# Patient Record
Sex: FEMALE | Race: BLACK OR AFRICAN AMERICAN | NOT HISPANIC OR LATINO | Employment: FULL TIME | URBAN - METROPOLITAN AREA
[De-identification: names, ages, dates, MRNs, and addresses within clinical notes are randomized per-mention and may not be internally consistent; named-entity substitution may affect disease eponyms.]

---

## 2017-01-10 ENCOUNTER — ALLSCRIPTS OFFICE VISIT (OUTPATIENT)
Dept: OTHER | Facility: OTHER | Age: 22
End: 2017-01-10

## 2017-01-13 LAB
CHLAMYDIA, LIQUID-BASED (HISTORICAL): DETECTED
GC BY MOL. METHOD (HISTORICAL): NOT DETECTED
PAP (HISTORICAL): ABNORMAL

## 2017-01-17 ENCOUNTER — GENERIC CONVERSION - ENCOUNTER (OUTPATIENT)
Dept: OTHER | Facility: OTHER | Age: 22
End: 2017-01-17

## 2017-02-10 ENCOUNTER — APPOINTMENT (OUTPATIENT)
Dept: LAB | Facility: CLINIC | Age: 22
End: 2017-02-10
Payer: COMMERCIAL

## 2017-02-10 ENCOUNTER — GENERIC CONVERSION - ENCOUNTER (OUTPATIENT)
Dept: OTHER | Facility: OTHER | Age: 22
End: 2017-02-10

## 2017-02-10 DIAGNOSIS — Z34.00 ENCOUNTER FOR SUPERVISION OF NORMAL FIRST PREGNANCY: ICD-10-CM

## 2017-02-10 PROCEDURE — 86336 INHIBIN A: CPT

## 2017-02-10 PROCEDURE — 82105 ALPHA-FETOPROTEIN SERUM: CPT

## 2017-02-10 PROCEDURE — 36415 COLL VENOUS BLD VENIPUNCTURE: CPT

## 2017-02-10 PROCEDURE — 82677 ASSAY OF ESTRIOL: CPT

## 2017-02-10 PROCEDURE — 84702 CHORIONIC GONADOTROPIN TEST: CPT

## 2017-02-14 LAB
2ND TRIMESTER 4 SCREEN SERPL-IMP: NORMAL
2ND TRIMESTER 4 SCREEN SERPL-IMP: NORMAL
AFP ADJ MOM SERPL: 1.59
AFP SERPL-MCNC: 48.1 NG/ML
AGE AT DELIVERY: 23.2 YEARS
FET TS 18 RISK FROM MAT AGE: NORMAL
FET TS 21 RISK FROM MAT AGE: 1097
GA METHOD: NORMAL
GA: 17 WEEKS
HCG ADJ MOM SERPL: 0.71
HCG SERPL-ACNC: NORMAL MIU/ML
IDDM PATIENT QL: NO
INHIBIN A ADJ MOM SERPL: 0.86
INHIBIN A SERPL-MCNC: 123.5 PG/ML
KARYOTYP BLD/T: NORMAL
Lab: NORMAL
MULTIPLE PREGNANCY: NORMAL
NEURAL TUBE DEFECT RISK FETUS: 2155 %
SERVICE CMNT-IMP: NORMAL
TS 18 RISK FETUS: NORMAL
TS 21 RISK FETUS: NORMAL
U ESTRIOL ADJ MOM SERPL: 0.88
U ESTRIOL SERPL-MCNC: 0.89 NG/ML

## 2017-03-04 ENCOUNTER — LAB CONVERSION - ENCOUNTER (OUTPATIENT)
Dept: OTHER | Facility: OTHER | Age: 22
End: 2017-03-04

## 2017-03-04 LAB — CHLAMYDIA, LIQUID-BASED (HISTORICAL): NOT DETECTED

## 2017-03-06 ENCOUNTER — GENERIC CONVERSION - ENCOUNTER (OUTPATIENT)
Dept: OTHER | Facility: OTHER | Age: 22
End: 2017-03-06

## 2017-03-08 ENCOUNTER — ALLSCRIPTS OFFICE VISIT (OUTPATIENT)
Dept: PERINATAL CARE | Facility: CLINIC | Age: 22
End: 2017-03-08
Payer: COMMERCIAL

## 2017-03-08 ENCOUNTER — GENERIC CONVERSION - ENCOUNTER (OUTPATIENT)
Dept: OTHER | Facility: OTHER | Age: 22
End: 2017-03-08

## 2017-03-08 PROCEDURE — 76817 TRANSVAGINAL US OBSTETRIC: CPT | Performed by: OBSTETRICS & GYNECOLOGY

## 2017-03-08 PROCEDURE — 76805 OB US >/= 14 WKS SNGL FETUS: CPT | Performed by: OBSTETRICS & GYNECOLOGY

## 2017-04-07 DIAGNOSIS — Z34.00 ENCOUNTER FOR SUPERVISION OF NORMAL FIRST PREGNANCY: ICD-10-CM

## 2017-04-20 ENCOUNTER — APPOINTMENT (OUTPATIENT)
Dept: LAB | Facility: CLINIC | Age: 22
End: 2017-04-20
Payer: COMMERCIAL

## 2017-04-20 DIAGNOSIS — Z34.00 ENCOUNTER FOR SUPERVISION OF NORMAL FIRST PREGNANCY: ICD-10-CM

## 2017-04-20 LAB
BASOPHILS # BLD AUTO: 0.03 THOUSANDS/ΜL (ref 0–0.1)
BASOPHILS NFR BLD AUTO: 0 % (ref 0–1)
EOSINOPHIL # BLD AUTO: 0.18 THOUSAND/ΜL (ref 0–0.61)
EOSINOPHIL NFR BLD AUTO: 2 % (ref 0–6)
ERYTHROCYTE [DISTWIDTH] IN BLOOD BY AUTOMATED COUNT: 13.7 % (ref 11.6–15.1)
GLUCOSE 1H P 50 G GLC PO SERPL-MCNC: 96 MG/DL
HCT VFR BLD AUTO: 35.2 % (ref 34.8–46.1)
HGB BLD-MCNC: 11.3 G/DL (ref 11.5–15.4)
LYMPHOCYTES # BLD AUTO: 1.65 THOUSANDS/ΜL (ref 0.6–4.47)
LYMPHOCYTES NFR BLD AUTO: 14 % (ref 14–44)
MCH RBC QN AUTO: 27.6 PG (ref 26.8–34.3)
MCHC RBC AUTO-ENTMCNC: 32.1 G/DL (ref 31.4–37.4)
MCV RBC AUTO: 86 FL (ref 82–98)
MONOCYTES # BLD AUTO: 0.87 THOUSAND/ΜL (ref 0.17–1.22)
MONOCYTES NFR BLD AUTO: 7 % (ref 4–12)
NEUTROPHILS # BLD AUTO: 9.23 THOUSANDS/ΜL (ref 1.85–7.62)
NEUTS SEG NFR BLD AUTO: 77 % (ref 43–75)
NRBC BLD AUTO-RTO: 0 /100 WBCS
PLATELET # BLD AUTO: 261 THOUSANDS/UL (ref 149–390)
PMV BLD AUTO: 10.8 FL (ref 8.9–12.7)
RBC # BLD AUTO: 4.09 MILLION/UL (ref 3.81–5.12)
WBC # BLD AUTO: 12.04 THOUSAND/UL (ref 4.31–10.16)

## 2017-04-20 PROCEDURE — 82950 GLUCOSE TEST: CPT

## 2017-04-20 PROCEDURE — 85025 COMPLETE CBC W/AUTO DIFF WBC: CPT

## 2017-04-20 PROCEDURE — 86592 SYPHILIS TEST NON-TREP QUAL: CPT

## 2017-04-20 PROCEDURE — 36415 COLL VENOUS BLD VENIPUNCTURE: CPT

## 2017-04-21 LAB — RPR SER QL: NORMAL

## 2017-05-03 ENCOUNTER — GENERIC CONVERSION - ENCOUNTER (OUTPATIENT)
Dept: OTHER | Facility: OTHER | Age: 22
End: 2017-05-03

## 2017-05-17 ENCOUNTER — GENERIC CONVERSION - ENCOUNTER (OUTPATIENT)
Dept: OTHER | Facility: OTHER | Age: 22
End: 2017-05-17

## 2017-06-13 ENCOUNTER — GENERIC CONVERSION - ENCOUNTER (OUTPATIENT)
Dept: OTHER | Facility: OTHER | Age: 22
End: 2017-06-13

## 2017-06-28 ENCOUNTER — GENERIC CONVERSION - ENCOUNTER (OUTPATIENT)
Dept: OTHER | Facility: OTHER | Age: 22
End: 2017-06-28

## 2017-06-28 ENCOUNTER — LAB REQUISITION (OUTPATIENT)
Dept: LAB | Facility: HOSPITAL | Age: 22
End: 2017-06-28
Payer: COMMERCIAL

## 2017-06-28 DIAGNOSIS — Z34.03 ENCOUNTER FOR SUPERVISION OF NORMAL FIRST PREGNANCY IN THIRD TRIMESTER: ICD-10-CM

## 2017-06-28 PROCEDURE — 87653 STREP B DNA AMP PROBE: CPT | Performed by: OBSTETRICS & GYNECOLOGY

## 2017-07-02 LAB — GP B STREP DNA SPEC QL NAA+PROBE: NORMAL

## 2017-07-05 ENCOUNTER — ALLSCRIPTS OFFICE VISIT (OUTPATIENT)
Dept: OTHER | Facility: OTHER | Age: 22
End: 2017-07-05

## 2017-07-05 ENCOUNTER — GENERIC CONVERSION - ENCOUNTER (OUTPATIENT)
Dept: OTHER | Facility: OTHER | Age: 22
End: 2017-07-05

## 2017-07-05 PROCEDURE — 87591 N.GONORRHOEAE DNA AMP PROB: CPT | Performed by: NURSE PRACTITIONER

## 2017-07-05 PROCEDURE — 87491 CHLMYD TRACH DNA AMP PROBE: CPT | Performed by: NURSE PRACTITIONER

## 2017-07-08 ENCOUNTER — LAB REQUISITION (OUTPATIENT)
Dept: LAB | Facility: HOSPITAL | Age: 22
End: 2017-07-08
Payer: COMMERCIAL

## 2017-07-08 DIAGNOSIS — Z20.2 CONTACT WITH AND (SUSPECTED) EXPOSURE TO INFECTIONS WITH A PREDOMINANTLY SEXUAL MODE OF TRANSMISSION: ICD-10-CM

## 2017-07-12 ENCOUNTER — GENERIC CONVERSION - ENCOUNTER (OUTPATIENT)
Dept: OTHER | Facility: OTHER | Age: 22
End: 2017-07-12

## 2017-07-14 LAB
CHLAMYDIA DNA CVX QL NAA+PROBE: NORMAL
N GONORRHOEA DNA GENITAL QL NAA+PROBE: NORMAL

## 2017-07-15 ENCOUNTER — GENERIC CONVERSION - ENCOUNTER (OUTPATIENT)
Dept: OTHER | Facility: OTHER | Age: 22
End: 2017-07-15

## 2017-07-19 ENCOUNTER — GENERIC CONVERSION - ENCOUNTER (OUTPATIENT)
Dept: OTHER | Facility: OTHER | Age: 22
End: 2017-07-19

## 2017-08-03 ENCOUNTER — GENERIC CONVERSION - ENCOUNTER (OUTPATIENT)
Dept: OTHER | Facility: OTHER | Age: 22
End: 2017-08-03

## 2017-08-04 ENCOUNTER — GENERIC CONVERSION - ENCOUNTER (OUTPATIENT)
Dept: OTHER | Facility: OTHER | Age: 22
End: 2017-08-04

## 2018-01-09 NOTE — RESULT NOTES
Verified Results  CHLAMYDIA, LIQUID-BASED [id number] 04:03PM Lilia Sue     Test Name Result Flag Reference   CHLAMYDIA, LIQUID-BASED Not Detected     SPECIMEN SOURCE:      CHLAMYDIA, LIQUID-BASED  CLINICAL INFORMATION: Provided Diagnosis Codes: Z20 2  CHLAMYDIA, LIQUID-BASED (1,2,3)  (1)  Chlamydia trachomatis (CT) and Neisseria gonorrhoeae (NG) qualitative   detection is performed on urogenital specimens or urine on one of the   below platforms:  -[employer](TM) Qx Amplified DNA Assay tested with the BD Viper(TM)   System using Strand Displacement Amplification technology  FDA cleared   for both ThinPrep, SurePath and urine samples  -The Aptima combo 2(R) Assay detects ribosomal RNA (rRNA) using target   capture and Transcription-Mediated Amplification (TMA) technology  FDA   cleared for ThinPrep samples  -The lisa(R) CT/NG v2 0 detects DNA using Polymerase Chain Reaction   (PCR) technology  FDA cleared for ThinPrep, male and female urine   samples  (2)  This test was evaluated and its performance characteristics determined   by [facility]  It has not been cleared or approved by   the U S  Food and Drug Administration  The FDA has determined that   such clearance or approval is not necessary  [facility]   is certified under the Clinical Laboratory Improvement Act of 1988   (CLIA) as qualified to perform high complexity clinical testing   (3)  Results should be interpreted together with past and current clinical   and laboratory data

## 2018-01-11 NOTE — RESULT NOTES
Verified Results  (1) CHLAMYDIA/GC AMPLIFIED DNA, PCR [id number] 03:00PM nAat Syed     Test Name Result Flag Reference   CHLAMYDIA,AMPLIFIED DNA PROBE   C  trachomatis Amplified DNA Negative   C  trachomatis Amplified DNA Negative   N  GONORRHOEAE AMPLIFIED DNA   N  gonorrhoeae Amplified DNA Negative   N  gonorrhoeae Amplified DNA Negative

## 2018-01-11 NOTE — PROGRESS NOTES
MAR 8 2017         RE: Greta Cockayne                                   To: [facility]  MR#: [id number]                                  [address]   : [address]   ENC: [id number]                             [address]   (Exam #: [id number])                           Fax: [phone]      The LMP of this 24year old,  G1, P*-0-0-0 patient was OCT 1 2016, her   working ALISA is 2017 and the current gestational age is 22 weeks 2   days by  [facility] [place] [address]  A sonographic examination was performed on MAR   8 2017 using real time equipment  The ultrasound examination was performed   using abdominal & vaginal techniques  The patient has a BMI of 30 3  Her   blood pressure today was 111/71  Earliest ultrasound found in her record: 01/10/17 13w1d 17 ALISA      Estefany Dumas is on prenatal vitamins and denies any allergies to medications  Prior to pregnancy she smoked cigarettes and drank alcohol socially  She   stopped both of these in pregnancy  She denies any use of illicit drugs  Her past medical history is significant for a chlamydia infection in   pregnancy  She was treated and test of cure was negative  She denies any   other past medical history or past surgical history or any first   generation family history of hypertension, diabetes, thrombosis or   congenital anomalies  She had a normal quad screen in this pregnancy  She   declined the flu shot        Cardiac motion was observed at 153 bpm       INDICATIONS      fetal anatomical survey      Exam Types      LEVEL II   Transvaginal      RESULTS      Fetus # 1 of 1   Vertex presentation   Fetal growth appeared normal   Placenta Location = Posterior   No placenta previa   Placenta Grade = I      MEASUREMENTS (* Included In Average GA)      AC              16 0 cm        20 weeks 6 days* (41%)   BPD              5 1 cm        21 weeks 3 days* (50%)   HC 19 0 cm        21 weeks 1 day * (45%)   Femur            3 6 cm        21 weeks 4 days* (49%)      Nuchal Fold      3 2 mm      Humerus          3 5 cm        22 weeks 1 day   (64%)      Cerebellum       2 4 cm        22 weeks 4 days   Biorbit          3 6 cm        22 weeks 6 days   CisternaMagna    7 1 mm      HC/AC           1 19   FL/AC           0 22   FL/BPD          0 70   EFW (Ac/Fl/Hc)   407 grams - 0 lbs 14 oz      THE AVERAGE GESTATIONAL AGE is 21 weeks 2 days +/- 10 days  AMNIOTIC FLUID         Largest Vertical Pocket = 3 0 cm   Amniotic Fluid: Normal      CERVICAL EVALUATION      SUPINE      Cervical Length: 4 60 cm      OTHER TEST RESULTS           Funneling?: No             Dynamic Changes?: No        Resp  To TFP?: No      ANATOMY      Head                                    Normal   Face/Neck                               Normal   Th  Cav  Normal   Heart                                   Normal   Abd  Cav  Normal   Stomach                                 Normal   Right Kidney                            Normal   Left Kidney                             Normal   Bladder                                 Normal   Abd  Wall                               Normal   Spine                                   Normal   Extrems                                 Normal   Genitalia                               Normal   Placenta                                Normal   Umbl  Cord                              Normal   Uterus                                  Normal   PCI                                     Normal      ANATOMY DETAILS      Visualized Appearing Sonographically Normal:   HEAD: (Calvarium, BPD Level, Cavum, Lateral Ventricles, Choroid Plexus,   Cerebellum, Cisterna Magna);    FACE/NECK: (Neck, Nuchal Fold, Profile,   Orbits, Nose/Lips, Palate, Face);    TH  CAV  : (Lungs, Diaphragm);       HEART: (Four Chamber View, Proximal Left Outflow, Proximal Right Outflow,   3VV, 3 Vessel Trachea, Short Axis of Greater Vessels, Ductal Arch, Aortic   Arch, Interventricular Septum, Interatrial Septum, IVC, SVC, Cardiac Axis,   Cardiac Position);    ABD  CAV : (Liver);    STOMACH, RIGHT KIDNEY, LEFT   KIDNEY, BLADDER, ABD  WALL, SPINE: (Cervical Spine, Thoracic Spine, Lumbar   Spine, Sacrum);    EXTREMS: (Lt Humerus, Rt Humerus, Lt Forearm, Rt   Forearm, Lt Hand, Rt Hand, Lt Femur, Rt Femur, Lt Low Leg, Rt Low Leg, Lt   Foot, Rt Foot);    GENITALIA (Female), PLACENTA, UMBL  CORD, UTERUS, PCI      ANATOMY COMMENTS       Her survey of the fetal anatomy is complete  No fetal structural abnormality or ultrasound marker for aneuploidy is   identified on the Level II ultrasound study today  Fetal growth and   amniotic fluid volume appear normal   Active movement of the fetal body &   extremities was seen  There is no suspicion of a subchorionic bleed  The   placental cord insertion was normal       ADNEXA      The left ovary appeared normal and measured 4 3 x 2 3 x 1 5 cm with a   volume of 7 8 cc  The right ovary was not visualized  IMPRESSION      Cruz IUP   21 weeks and 2 days by this ultrasound  (ALISA=JUL 17 2017)   21 weeks and 2 days by 1st Tri Sono  (ALISA=JUL 17 2017)   Vertex presentation   Fetal growth appeared normal   Normal anatomy survey   Regular fetal heart rate of 153 bpm   Posterior placenta   No placenta previa      RECOMMENDATION      Growth Ultrasound: As indicated      VAMSI Valentino M D     Maternal-Fetal Medicine   Electronically signed 03/09/17 07:17

## 2018-01-12 VITALS
DIASTOLIC BLOOD PRESSURE: 71 MMHG | SYSTOLIC BLOOD PRESSURE: 111 MMHG | WEIGHT: 205.4 LBS | BODY MASS INDEX: 30.42 KG/M2 | HEIGHT: 69 IN

## 2018-01-13 VITALS
DIASTOLIC BLOOD PRESSURE: 70 MMHG | WEIGHT: 194 LBS | BODY MASS INDEX: 28.73 KG/M2 | HEIGHT: 69 IN | SYSTOLIC BLOOD PRESSURE: 130 MMHG

## 2018-01-15 NOTE — MISCELLANEOUS
Message  L/M on pts' voicemail asking that she call office to inform if she delivered at [facility]   ie: pt was due on 7/19/17, has not been seen since that date   was a N/S for 7/25/17 appt  Active Problems    1  Encounter for supervision of normal first pregnancy in third trimester (V22 0) (Z34 03)   2  Exposure to chlamydia (V01 6) (Z20 2)   3  Need for Tdap vaccination (V06 1) (Z23)   4  Pregnancy (V22 2) (Z34 90)   5  Pregnancy, first, obstetrical care (V22 0) (Z34 00)    Current Meds   1  Prenatal Plus 27-1 MG Oral Tablet; TAKE 1 TABLET DAILY  Requested for: [id number];   Last Rx:[id number] Ordered    Allergies    1  No Known Drug Allergies    2  No Known Environmental Allergies   3   Other    Signatures   Electronically signed by : Helio Littlejohn RN; Aug  3 2017  1:39PM EST                       (Author)

## 2018-01-15 NOTE — MISCELLANEOUS
Message   Recorded as Task   Date: 01/16/2017 05:03 PM, Created By: Sarah Duron   Task Name: Go to Result   Assigned To: Farshad Rodney   Regarding Patient: Ray Stafford, Status: In Progress   Comment:    Odilon Martinez - 16 Jan 2017 5:03 PM     TASK CREATED  Looks like patient has positive chlamydia  Rx Zithromax 1 gram PO, if not allergic  [facility] 16 Jan 2017 5:25 PM     TASK IN PROGRESS   [facility] 16 Jan 2017 5:40 PM     TASK REPLIED TO: Previously Assigned To [address] with pt  She is aware of result  She reports only one sexual partner - her boyfriend  She will have him go to be treated  Pt aware she needs to be treated and have f/u ARTIE  Zithromax 500MG, take 2 pills 1x PO, no additional doses  Ordered to pts pharm at [facility] - 17 Jan 2017 1:45 PM     TASK REPLIED TO: Previously Assigned To Nilam Morales 79 department notified today  Form to be scanned in  Active Problems    1  Chlamydial infection (079 98) (A74 9)   2  Pregnancy (V22 2) (Z33 1)   3  Pregnancy, first, obstetrical care (V22 0) (Z34 00)    Current Meds   1  Prenatal TABS; Therapy: (Recorded:52Lte0448) to Recorded   2  Zithromax 500 MG Oral Tablet (Azithromycin); Therapy: 96HVT0093 to (Evaluate:95Abr4149) Recorded    Allergies    1   No Known Drug Allergies    Signatures   Electronically signed by : Nik Ruggiero, ; Jan 17 2017  1:45PM EST                       (Author)

## 2018-01-17 NOTE — MISCELLANEOUS
Message   Recorded as Task   Date: 08/04/2017 12:01 PM, Created By: Franklyn Landeros   Task Name: Call Back   Assigned To: Yessica Larry   Regarding Patient: Brian Michael, Status: Active   Comment:    Franklyn Landeros - 04 Aug 2017 12:01 PM     TASK CREATED  Pt called office today  Pt states she is returning call from nursing staff  Pt further states she wanted to make sure staff was aware that she gave birth at [facility]  Pt requests that nursing staff return her call at [phone]  Thank you! Yumiko,Jan - 04 Aug 2017 12:49 PM     TASK EDITED  returned pts' call pt states "I did deliver at [facility] on 7/27/17   ' will go there for her PP visit  pt to call if she desires to continue her routine gyn care at [place] [address]    1  Encounter for supervision of normal first pregnancy in third trimester (V22 0) (Z34 03)   2  Exposure to chlamydia (V01 6) (Z20 2)   3  Need for Tdap vaccination (V06 1) (Z23)   4  Pregnancy (V22 2) (Z34 90)   5  Pregnancy, first, obstetrical care (V22 0) (Z34 00)    Current Meds   1  Prenatal Plus 27-1 MG Oral Tablet; TAKE 1 TABLET DAILY  Requested for: [id number];   Last Rx:[id number] Ordered    Allergies    1  No Known Drug Allergies    2  No Known Environmental Allergies   3   Other    Signatures   Electronically signed by : Naya Mccoy RN; Aug  4 2017 12:49PM EST                       (Author)

## 2018-01-22 VITALS
WEIGHT: 218 LBS | DIASTOLIC BLOOD PRESSURE: 66 MMHG | SYSTOLIC BLOOD PRESSURE: 104 MMHG | BODY MASS INDEX: 32.29 KG/M2 | HEIGHT: 69 IN

## 2018-01-22 VITALS
WEIGHT: 225 LBS | HEIGHT: 69 IN | DIASTOLIC BLOOD PRESSURE: 74 MMHG | SYSTOLIC BLOOD PRESSURE: 120 MMHG | BODY MASS INDEX: 33.33 KG/M2

## 2018-01-22 VITALS
SYSTOLIC BLOOD PRESSURE: 115 MMHG | DIASTOLIC BLOOD PRESSURE: 82 MMHG | BODY MASS INDEX: 33.33 KG/M2 | WEIGHT: 225 LBS | HEIGHT: 69 IN

## 2018-01-22 VITALS
SYSTOLIC BLOOD PRESSURE: 110 MMHG | HEIGHT: 69 IN | WEIGHT: 217 LBS | DIASTOLIC BLOOD PRESSURE: 66 MMHG | BODY MASS INDEX: 32.14 KG/M2

## 2018-01-22 VITALS
SYSTOLIC BLOOD PRESSURE: 100 MMHG | DIASTOLIC BLOOD PRESSURE: 68 MMHG | HEIGHT: 69 IN | BODY MASS INDEX: 33.18 KG/M2 | WEIGHT: 224 LBS

## 2018-01-22 VITALS
WEIGHT: 204 LBS | DIASTOLIC BLOOD PRESSURE: 82 MMHG | BODY MASS INDEX: 30.21 KG/M2 | SYSTOLIC BLOOD PRESSURE: 118 MMHG | HEIGHT: 69 IN

## 2018-01-22 VITALS
HEIGHT: 69 IN | SYSTOLIC BLOOD PRESSURE: 102 MMHG | WEIGHT: 221 LBS | BODY MASS INDEX: 32.73 KG/M2 | DIASTOLIC BLOOD PRESSURE: 64 MMHG

## 2018-01-22 VITALS
WEIGHT: 221 LBS | DIASTOLIC BLOOD PRESSURE: 62 MMHG | HEIGHT: 69 IN | BODY MASS INDEX: 32.73 KG/M2 | SYSTOLIC BLOOD PRESSURE: 120 MMHG

## 2018-03-07 NOTE — PROGRESS NOTES
Education  Mobile Messenger Education 1st Trimester:   First Trimester Education provided:   benefits of breastfeeding, importance of exclusive breastfeeding, early initiation of breastfeeding, exclusive breastfeeding for the first 6 months and Pregnancy Essentials Reference Guide given   The patient is planning on breastfeeding  Prenatal education provided by: Joey Mcallister MA      Signatures   Electronically signed by :  Joey Mcallister, ; Dec 28 2016  7:47AM EST                       (Author)    Electronically signed by : Ramo Traylor MD; Dec 28 2016 10:58AM EST

## 2018-03-07 NOTE — PROGRESS NOTES
Education  Baby & Me Education 2nd Trimester:   Second Trimester Education provided:   non-pharmacologic pain relief methods for labor and rooming-in on 24 hour basis  Baby & Me Education 3rd Trimester: Third Trimester Education provided:   benefits of breastfeeding, importance of exclusive breastfeeding, early initiation of breastfeeding, exclusive breastfeeding for the first 6 months, frequent feedings for optimal milk production, feeding on demand/baby-led feedings, effective positioning and attachment, non-pharmacologic pain relief methods for labor and importance of early skin-to-skin contact  rooming-in on 24 hour basis   The patient is planning on breastfeeding  The patient is planning on exclusively breastfeeding until the baby is 10months of age  Mother has not registered for prenatal class  Thought has been given to selecting a pediatrician  The mother has discussed personal preferences with her provider     Prenatal education provided by: ALISON Kc      Signatures   Electronically signed by : WOLFGANG Spear; Jul 5 2017  4:00PM EST                       (Author)    Electronically signed by : WOLFGANG Spear; Jul 5 2017  4:00PM EST                       (Author)

## 2021-12-31 ENCOUNTER — APPOINTMENT (EMERGENCY)
Dept: CT IMAGING | Facility: HOSPITAL | Age: 26
End: 2021-12-31
Payer: COMMERCIAL

## 2021-12-31 ENCOUNTER — APPOINTMENT (EMERGENCY)
Dept: RADIOLOGY | Facility: HOSPITAL | Age: 26
End: 2021-12-31
Payer: COMMERCIAL

## 2021-12-31 ENCOUNTER — HOSPITAL ENCOUNTER (EMERGENCY)
Facility: HOSPITAL | Age: 26
Discharge: HOME | End: 2021-12-31
Attending: EMERGENCY MEDICINE | Admitting: EMERGENCY MEDICINE
Payer: COMMERCIAL

## 2021-12-31 VITALS
HEIGHT: 70 IN | DIASTOLIC BLOOD PRESSURE: 72 MMHG | OXYGEN SATURATION: 100 % | RESPIRATION RATE: 18 BRPM | TEMPERATURE: 98.7 F | WEIGHT: 209 LBS | SYSTOLIC BLOOD PRESSURE: 115 MMHG | BODY MASS INDEX: 29.92 KG/M2 | HEART RATE: 70 BPM

## 2021-12-31 DIAGNOSIS — U07.1 COVID: Primary | ICD-10-CM

## 2021-12-31 LAB
ALBUMIN SERPL BCP-MCNC: 3.5 G/DL (ref 3.5–5)
ALP SERPL-CCNC: 75 U/L (ref 46–116)
ALT SERPL W P-5'-P-CCNC: 30 U/L (ref 12–78)
ANION GAP SERPL CALCULATED.3IONS-SCNC: 8 MMOL/L (ref 4–13)
AST SERPL W P-5'-P-CCNC: 20 U/L (ref 5–45)
BASOPHILS # BLD AUTO: 0.02 THOUSANDS/ΜL (ref 0–0.1)
BASOPHILS NFR BLD AUTO: 1 % (ref 0–1)
BILIRUB SERPL-MCNC: 0.1 MG/DL (ref 0.2–1)
BILIRUB UR QL STRIP: NEGATIVE
BUN SERPL-MCNC: 11 MG/DL (ref 5–25)
CALCIUM SERPL-MCNC: 8.6 MG/DL (ref 8.3–10.1)
CHLORIDE SERPL-SCNC: 106 MMOL/L (ref 100–108)
CLARITY UR: CLEAR
CO2 SERPL-SCNC: 27 MMOL/L (ref 21–32)
COLOR UR: YELLOW
CREAT SERPL-MCNC: 1.03 MG/DL (ref 0.6–1.3)
EOSINOPHIL # BLD AUTO: 0.12 THOUSAND/ΜL (ref 0–0.61)
EOSINOPHIL NFR BLD AUTO: 3 % (ref 0–6)
ERYTHROCYTE [DISTWIDTH] IN BLOOD BY AUTOMATED COUNT: 14.2 % (ref 11.6–15.1)
FLUAV RNA RESP QL NAA+PROBE: NEGATIVE
FLUBV RNA RESP QL NAA+PROBE: NEGATIVE
GFR SERPL CREATININE-BSD FRML MDRD: 74 ML/MIN/1.73SQ M
GLUCOSE SERPL-MCNC: 91 MG/DL (ref 65–140)
GLUCOSE UR STRIP-MCNC: NEGATIVE MG/DL
HCT VFR BLD AUTO: 40.6 % (ref 34.8–46.1)
HGB BLD-MCNC: 12.5 G/DL (ref 11.5–15.4)
HGB UR QL STRIP.AUTO: NEGATIVE
IMM GRANULOCYTES # BLD AUTO: 0 THOUSAND/UL (ref 0–0.2)
IMM GRANULOCYTES NFR BLD AUTO: 0 % (ref 0–2)
KETONES UR STRIP-MCNC: NEGATIVE MG/DL
LEUKOCYTE ESTERASE UR QL STRIP: NEGATIVE
LIPASE SERPL-CCNC: 154 U/L (ref 73–393)
LYMPHOCYTES # BLD AUTO: 1.64 THOUSANDS/ΜL (ref 0.6–4.47)
LYMPHOCYTES NFR BLD AUTO: 42 % (ref 14–44)
MCH RBC QN AUTO: 26.1 PG (ref 26.8–34.3)
MCHC RBC AUTO-ENTMCNC: 30.8 G/DL (ref 31.4–37.4)
MCV RBC AUTO: 85 FL (ref 82–98)
MONOCYTES # BLD AUTO: 0.38 THOUSAND/ΜL (ref 0.17–1.22)
MONOCYTES NFR BLD AUTO: 10 % (ref 4–12)
NEUTROPHILS # BLD AUTO: 1.72 THOUSANDS/ΜL (ref 1.85–7.62)
NEUTS SEG NFR BLD AUTO: 44 % (ref 43–75)
NITRITE UR QL STRIP: NEGATIVE
NRBC BLD AUTO-RTO: 0 /100 WBCS
PH UR STRIP.AUTO: 6 [PH]
PLATELET # BLD AUTO: 297 THOUSANDS/UL (ref 149–390)
PMV BLD AUTO: 9.8 FL (ref 8.9–12.7)
POTASSIUM SERPL-SCNC: 4.2 MMOL/L (ref 3.5–5.3)
PROT SERPL-MCNC: 7.3 G/DL (ref 6.4–8.2)
PROT UR STRIP-MCNC: NEGATIVE MG/DL
RBC # BLD AUTO: 4.79 MILLION/UL (ref 3.81–5.12)
RSV RNA RESP QL NAA+PROBE: NEGATIVE
SARS-COV-2 RNA RESP QL NAA+PROBE: POSITIVE
SODIUM SERPL-SCNC: 141 MMOL/L (ref 136–145)
SP GR UR STRIP.AUTO: 1.02 (ref 1–1.03)
UROBILINOGEN UR QL STRIP.AUTO: 0.2 E.U./DL
WBC # BLD AUTO: 3.88 THOUSAND/UL (ref 4.31–10.16)

## 2021-12-31 PROCEDURE — 71045 X-RAY EXAM CHEST 1 VIEW: CPT

## 2021-12-31 PROCEDURE — 80053 COMPREHEN METABOLIC PANEL: CPT | Performed by: EMERGENCY MEDICINE

## 2021-12-31 PROCEDURE — 74177 CT ABD & PELVIS W/CONTRAST: CPT

## 2021-12-31 PROCEDURE — 99284 EMERGENCY DEPT VISIT MOD MDM: CPT

## 2021-12-31 PROCEDURE — 96374 THER/PROPH/DIAG INJ IV PUSH: CPT

## 2021-12-31 PROCEDURE — 72125 CT NECK SPINE W/O DYE: CPT

## 2021-12-31 PROCEDURE — 81003 URINALYSIS AUTO W/O SCOPE: CPT | Performed by: EMERGENCY MEDICINE

## 2021-12-31 PROCEDURE — 85025 COMPLETE CBC W/AUTO DIFF WBC: CPT | Performed by: EMERGENCY MEDICINE

## 2021-12-31 PROCEDURE — 0241U HB NFCT DS VIR RESP RNA 4 TRGT: CPT | Performed by: EMERGENCY MEDICINE

## 2021-12-31 PROCEDURE — 36415 COLL VENOUS BLD VENIPUNCTURE: CPT | Performed by: EMERGENCY MEDICINE

## 2021-12-31 PROCEDURE — 99284 EMERGENCY DEPT VISIT MOD MDM: CPT | Performed by: EMERGENCY MEDICINE

## 2021-12-31 PROCEDURE — 83690 ASSAY OF LIPASE: CPT | Performed by: EMERGENCY MEDICINE

## 2021-12-31 RX ORDER — KETOROLAC TROMETHAMINE 30 MG/ML
15 INJECTION, SOLUTION INTRAMUSCULAR; INTRAVENOUS ONCE
Status: COMPLETED | OUTPATIENT
Start: 2021-12-31 | End: 2021-12-31

## 2021-12-31 RX ADMIN — KETOROLAC TROMETHAMINE 15 MG: 30 INJECTION, SOLUTION INTRAMUSCULAR at 17:47

## 2021-12-31 RX ADMIN — IOHEXOL 100 ML: 350 INJECTION, SOLUTION INTRAVENOUS at 18:17

## 2022-01-01 NOTE — ED PROVIDER NOTES
History  Chief Complaint   Patient presents with    Back Pain     pt c/o burning pain in back and stomach since having lipsoction 6 weeks ago    Numbness     pt also c/o numbness in her hands since her surgery     71-year-old female presenting to the emergency department for evaluation of back pain  Patient has had burning back and abdominal pain status post liposuction which was done in [place]  The surgery was about 6 weeks ago, she was well at the time but started noticing symptoms over the past week  Patient has also been febrile  Patient also complains of bilateral numbness of her 4th and 5th digit and ulnar side of her hands bilaterally which has been since the surgery  No weakness  No other neurologic complaints  No back or neck pain  Prior to Admission Medications   Prescriptions Last Dose Informant Patient Reported? Taking? Prenatal Vit-Fe Fumarate-FA (PRENATAL VITAMIN PO)   Yes No   Sig: Take 1 tablet by mouth daily      Facility-Administered Medications: None       Past Medical History:   Diagnosis Date    HPV in female     Varicella     vaccine       History reviewed  No pertinent surgical history  Family History   Problem Relation Age of Onset    Cancer Mother     Hypertension Mother     Asthma Sister     Asthma Brother     Cancer Paternal Grandmother     Diabetes Paternal Grandmother      I have reviewed and agree with the history as documented  E-Cigarette/Vaping     E-Cigarette/Vaping Substances     Social History     Tobacco Use    Smoking status: Former Smoker    Smokeless tobacco: Never Used   Substance Use Topics    Alcohol use: No    Drug use: No       Review of Systems   Constitutional: Negative for appetite change, chills, fatigue and fever  HENT: Negative for sneezing and sore throat  Eyes: Negative for visual disturbance  Respiratory: Negative for cough, choking, chest tightness, shortness of breath and wheezing      Cardiovascular: Negative for chest pain and palpitations  Gastrointestinal: Negative for abdominal pain, constipation, diarrhea, nausea and vomiting  Genitourinary: Negative for difficulty urinating and dysuria  Musculoskeletal: Positive for myalgias  Neurological: Positive for numbness  Negative for dizziness, weakness, light-headedness and headaches  All other systems reviewed and are negative  Physical Exam  Physical Exam  Vitals and nursing note reviewed  Constitutional:       General: She is not in acute distress  Appearance: She is well-developed  She is not diaphoretic  HENT:      Head: Normocephalic and atraumatic  Eyes:      Pupils: Pupils are equal, round, and reactive to light  Neck:      Vascular: No JVD  Trachea: No tracheal deviation  Cardiovascular:      Rate and Rhythm: Normal rate and regular rhythm  Heart sounds: Normal heart sounds  No murmur heard  No friction rub  No gallop  Pulmonary:      Effort: Pulmonary effort is normal  No respiratory distress  Breath sounds: Normal breath sounds  No wheezing or rales  Abdominal:      General: Bowel sounds are normal  There is no distension  Palpations: Abdomen is soft  Tenderness: There is no abdominal tenderness  There is no guarding or rebound  Skin:     General: Skin is warm and dry  Coloration: Skin is not pale  Comments: Surgical sites are clean dry and intact, minimally tender  Neurological:      Mental Status: She is alert and oriented to person, place, and time  Cranial Nerves: No cranial nerve deficit  Motor: No abnormal muscle tone  Comments: Paresthesias along the C8 dermatome bilaterally     Psychiatric:         Behavior: Behavior normal          Vital Signs  ED Triage Vitals   Temperature Pulse Respirations Blood Pressure SpO2   12/31/21 1529 12/31/21 1529 12/31/21 1529 12/31/21 1529 12/31/21 1529   98 7 °F (37 1 °C) 97 19 136/69 99 %      Temp Source Heart Rate Source Patient Position - Orthostatic VS BP Location FiO2 (%)   12/31/21 1529 12/31/21 1529 12/31/21 1529 12/31/21 1529 --   Temporal Monitor Sitting Left arm       Pain Score       12/31/21 1943       2           Vitals:    12/31/21 1529 12/31/21 1804 12/31/21 1944   BP: 136/69 111/68 115/72   Pulse: 97 76 70   Patient Position - Orthostatic VS: Sitting Sitting Sitting         Visual Acuity      ED Medications  Medications   ketorolac (TORADOL) injection 15 mg (15 mg Intravenous Given 12/31/21 1747)   iohexol (OMNIPAQUE) 350 MG/ML injection (SINGLE-DOSE) 100 mL (100 mL Intravenous Given 12/31/21 1817)       Diagnostic Studies  Results Reviewed     Procedure Component Value Units Date/Time    UA w Reflex to Microscopic w Reflex to Culture [[id number]] Collected: 12/31/21 1836    Lab Status: Final result Specimen: Urine, Clean Catch Updated: 12/31/21 1842     Color, UA Yellow     Clarity, UA Clear     Specific Blanco, UA 1 020     pH, UA 6 0     Leukocytes, UA Negative     Nitrite, UA Negative     Protein, UA Negative mg/dl      Glucose, UA Negative mg/dl      Ketones, UA Negative mg/dl      Urobilinogen, UA 0 2 E U /dl      Bilirubin, UA Negative     Blood, UA Negative    COVID/FLU/RSV [[id number]]  (Abnormal) Collected: 12/31/21 1742    Lab Status: Final result Specimen: Nares from Nose Updated: 12/31/21 1828     SARS-CoV-2 Positive     INFLUENZA A PCR Negative     INFLUENZA B PCR Negative     RSV PCR Negative    Narrative:      FOR PEDIATRIC PATIENTS - copy/paste COVID Guidelines URL to browser: [email or web address]     This test has been authorized by FDA under an EUA (Emergency Use Assay) for use by authorized laboratories  Clinical caution and judgement should be used with the interpretation of these results with consideration of the clinical impression and other laboratory testing    Testing reported as "Positive" or "Negative" has been proven to be accurate according to standard laboratory validation requirements  All testing is performed with control materials showing appropriate reactivity at standard intervals      Comprehensive metabolic panel [[id number]]  (Abnormal) Collected: 12/31/21 1742    Lab Status: Final result Specimen: Blood from Arm, Right Updated: 12/31/21 1801     Sodium 141 mmol/L      Potassium 4 2 mmol/L      Chloride 106 mmol/L      CO2 27 mmol/L      ANION GAP 8 mmol/L      BUN 11 mg/dL      Creatinine 1 03 mg/dL      Glucose 91 mg/dL      Calcium 8 6 mg/dL      AST 20 U/L      ALT 30 U/L      Alkaline Phosphatase 75 U/L      Total Protein 7 3 g/dL      Albumin 3 5 g/dL      Total Bilirubin 0 10 mg/dL      eGFR 74 ml/min/1 73sq m     Narrative:      National Kidney Disease Foundation guidelines for Chronic Kidney Disease (CKD):     Stage 1 with normal or high GFR (GFR > 90 mL/min/1 73 square meters)    Stage 2 Mild CKD (GFR = 60-89 mL/min/1 73 square meters)    Stage 3A Moderate CKD (GFR = 45-59 mL/min/1 73 square meters)    Stage 3B Moderate CKD (GFR = 30-44 mL/min/1 73 square meters)    Stage 4 Severe CKD (GFR = 15-29 mL/min/1 73 square meters)    Stage 5 End Stage CKD (GFR <15 mL/min/1 73 square meters)  Note: GFR calculation is accurate only with a steady state creatinine    Lipase [[id number]]  (Normal) Collected: 12/31/21 1742    Lab Status: Final result Specimen: Blood from Arm, Right Updated: 12/31/21 1801     Lipase 154 u/L     CBC and differential [[id number]]  (Abnormal) Collected: 12/31/21 1742    Lab Status: Final result Specimen: Blood from Arm, Right Updated: 12/31/21 1748     WBC 3 88 Thousand/uL      RBC 4 79 Million/uL      Hemoglobin 12 5 g/dL      Hematocrit 40 6 %      MCV 85 fL      MCH 26 1 pg      MCHC 30 8 g/dL      RDW 14 2 %      MPV 9 8 fL      Platelets 079 Thousands/uL      nRBC 0 /100 WBCs      Neutrophils Relative 44 %      Immat GRANS % 0 %      Lymphocytes Relative 42 %      Monocytes Relative 10 %      Eosinophils Relative 3 %      Basophils Relative 1 %      Neutrophils Absolute 1 72 Thousands/µL      Immature Grans Absolute 0 00 Thousand/uL      Lymphocytes Absolute 1 64 Thousands/µL      Monocytes Absolute 0 38 Thousand/µL      Eosinophils Absolute 0 12 Thousand/µL      Basophils Absolute 0 02 Thousands/µL                  CT abdomen pelvis with contrast   Final Result by Nina Trivedi MD (12/31 1833)      Postop changes of abdominal wall and back as described in keeping with the provided history of liposuction  No suspicious fluid collections or findings to suggest infection  No worrisome intra-abdominal pathology seen  Small fat-containing umbilical hernia            Workstation performed: [id number]         CT spine cervical without contrast   Final Result by Army Sicard, DO (12/31 1909)      Slight reversal of the cervical lordosis  Otherwise, normal CT cervical spine  Workstation performed: [id number]         XR chest 1 view portable    (Results Pending)              Procedures  Procedures         ED Course                                             MDM  Number of Diagnoses or Management Options  COVID  Diagnosis management comments: 78-year-old female with paresthesias, our bilateral ulnar neuropathy or possible degenerative disc disease at C8 dermatome level  Will check labs, scan, COVID swab, reassess  Workup unremarkable for complications or surgical pathology, patient's COVID test is positive  Disposition  Final diagnoses:   COVID     Time reflects when diagnosis was documented in both MDM as applicable and the Disposition within this note     Time User Action Codes Description Comment    12/31/2021  7:38 PM Charli, [facility] [U07 1] [employer]       ED Disposition     ED Disposition Condition Date/Time Comment    Discharge Stable Fri Dec 31, 2021  7:38 PM Anuja Odor discharge to home/self care              Follow-up Information     Follow up With Specialties Details Why Contact Info    Mendel Way MD [facility]   [address] [facility], [address] [place] [address]  [phone]            Discharge Medication List as of 12/31/2021  7:38 PM      CONTINUE these medications which have NOT CHANGED    Details   Prenatal Vit-Fe Fumarate-FA (PRENATAL VITAMIN PO) Take 1 tablet by mouth daily, Historical Med             No discharge procedures on file      PDMP Review     None          ED Provider  Electronically Signed by           Yessica Gallo MD  01/01/22 0095

## 2023-03-03 ENCOUNTER — APPOINTMENT (OUTPATIENT)
Dept: RADIOLOGY | Facility: CLINIC | Age: 28
End: 2023-03-03

## 2023-03-03 ENCOUNTER — OCCMED (OUTPATIENT)
Dept: URGENT CARE | Facility: CLINIC | Age: 28
End: 2023-03-03

## 2023-03-03 DIAGNOSIS — S91.331A PUNCTURE WOUND OF RIGHT FOOT, INITIAL ENCOUNTER: Primary | ICD-10-CM

## 2023-03-03 DIAGNOSIS — S91.331A PUNCTURE WOUND OF RIGHT FOOT, INITIAL ENCOUNTER: ICD-10-CM

## 2023-03-06 ENCOUNTER — OCCMED (OUTPATIENT)
Dept: URGENT CARE | Facility: CLINIC | Age: 28
End: 2023-03-06

## 2023-03-06 DIAGNOSIS — S91.331D PUNCTURE WOUND OF RIGHT FOOT, SUBSEQUENT ENCOUNTER: Primary | ICD-10-CM

## 2025-03-05 ENCOUNTER — TELEPHONE (OUTPATIENT)
Age: 30
End: 2025-03-05

## 2025-03-05 NOTE — TELEPHONE ENCOUNTER
Patient spouse calling in stating patients ALISA was 25 - based on ALISA provided, she would be 40w5d today. Spouse states [facility] is [facility], but patient seeking second opinion and looking to establish care with [facility]. States she [facility] by [facility] will possibly need to have a  - per spouse, patient is 1 cm dilated and 60% effaced. Spouse unsure which office patient would like to establish care with. Informed spouse patient should call back once she determines preferred office and  will need to be contacted for further review. Spouse verbalized understanding.